# Patient Record
Sex: FEMALE
[De-identification: names, ages, dates, MRNs, and addresses within clinical notes are randomized per-mention and may not be internally consistent; named-entity substitution may affect disease eponyms.]

---

## 2023-05-15 PROBLEM — Z00.00 ENCOUNTER FOR PREVENTIVE HEALTH EXAMINATION: Status: ACTIVE | Noted: 2023-05-15

## 2023-05-18 ENCOUNTER — APPOINTMENT (OUTPATIENT)
Dept: ORTHOPEDIC SURGERY | Facility: CLINIC | Age: 63
End: 2023-05-18
Payer: SELF-PAY

## 2023-05-18 VITALS — HEIGHT: 67 IN | WEIGHT: 270 LBS | BODY MASS INDEX: 42.38 KG/M2

## 2023-05-18 DIAGNOSIS — Z78.9 OTHER SPECIFIED HEALTH STATUS: ICD-10-CM

## 2023-05-18 PROCEDURE — 20610 DRAIN/INJ JOINT/BURSA W/O US: CPT | Mod: 50

## 2023-05-18 PROCEDURE — 73562 X-RAY EXAM OF KNEE 3: CPT | Mod: 50

## 2023-05-18 PROCEDURE — 99214 OFFICE O/P EST MOD 30 MIN: CPT | Mod: 25

## 2023-05-18 PROCEDURE — J3490M: CUSTOM

## 2023-05-18 NOTE — DISCUSSION/SUMMARY
[de-identified] : Progress Note completed by Berkley Kaur PA-C\par * Dr. Malik -- The documentation recorded in this note accurately reflects the decisions made by me during this visit.

## 2023-05-18 NOTE — PHYSICAL EXAM
[Positive] : positive Goldy [NL (0)] : extension 0 degrees [5___] : hamstring 5[unfilled]/5 [Equivocal] : equivocal Goldy [] : patient ambulates without assistive device [Left] : left knee [Right] : right knee [Degenerative change] : Degenerative change [TWNoteComboBox7] : flexion 105 degrees

## 2023-05-18 NOTE — PROCEDURE
[Large Joint Injection] : Large joint injection [Bilateral] : bilaterally of the [Knee] : knee [Pain] : pain [Alcohol] : alcohol [Betadine] : betadine [___ cc    3mg] :  Betamethasone (Celestone) ~Vcc of 3mg [___ cc    1%] : Lidocaine ~Vcc of 1%  [___ cc    0.25%] : Bupivacaine (Marcaine) ~Vcc of 0.25%  [] : Patient tolerated procedure well [Call if redness, pain or fever occur] : call if redness, pain or fever occur [Apply ice for 15min out of every hour for the next 12-24 hours as tolerated] : apply ice for 15 minutes out of every hour for the next 12-24 hours as tolerated [Patient was advised to rest the joint(s) for ____ days] : patient was advised to rest the joint(s) for [unfilled] days [Previous OTC use and PT nontherapeutic] : patient has tried OTC's including aspirin, Ibuprofen, Aleve, etc or prescription NSAIDS, and/or exercises at home and/or physical therapy without satisfactory response [Patient had decreased mobility in the joint] : patient had decreased mobility in the joint [Risks, benefits, alternatives discussed / Verbal consent obtained] : the risks benefits, and alternatives have been discussed, and verbal consent was obtained [All ultrasound images have been permanently captured and stored accordingly in our picture archiving and communication system] : All ultrasound images have been permanently captured and stored accordingly in our picture archiving and communication system [Visualization of the needle and placement of injection was performed without complication] : visualization of the needle and placement of injection was performed without complication [Inflammation] : inflammation [de-identified] : for accurate placement of needle into knee joint.

## 2023-05-18 NOTE — ASSESSMENT
[FreeTextEntry1] : acute bilateral knee pain since feb 2023.  no injury.  right knee worse than left.   possible oa exacerbation, possible mmt rich on right.\par \par has tried rest, ice, acupuncture, lido patches, nsaids prn. 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

## 2023-05-18 NOTE — HISTORY OF PRESENT ILLNESS
[6] : 6 [0] : 0 [Burning] : burning [Dull/Aching] : dull/aching [Localized] : localized [Sharp] : sharp [Shooting] : shooting [Stabbing] : stabbing [Throbbing] : throbbing [Constant] : constant [Nothing helps with pain getting better] : Nothing helps with pain getting better [Walking] : walking [de-identified] : 5/18/23:  acute bilateral knee pain since feb 2023.  no injury.  [] : no [FreeTextEntry1] : bilateral knees  [FreeTextEntry6] : swelling

## 2023-06-15 ENCOUNTER — APPOINTMENT (OUTPATIENT)
Dept: ORTHOPEDIC SURGERY | Facility: CLINIC | Age: 63
End: 2023-06-15

## 2023-06-16 ENCOUNTER — APPOINTMENT (OUTPATIENT)
Dept: ORTHOPEDIC SURGERY | Facility: CLINIC | Age: 63
End: 2023-06-16
Payer: SELF-PAY

## 2023-06-16 VITALS — WEIGHT: 270 LBS | HEIGHT: 67 IN | BODY MASS INDEX: 42.38 KG/M2

## 2023-06-16 DIAGNOSIS — M23.91 UNSPECIFIED INTERNAL DERANGEMENT OF RIGHT KNEE: ICD-10-CM

## 2023-06-16 DIAGNOSIS — M23.92 UNSPECIFIED INTERNAL DERANGEMENT OF LEFT KNEE: ICD-10-CM

## 2023-06-16 PROCEDURE — 99213 OFFICE O/P EST LOW 20 MIN: CPT

## 2023-06-16 RX ORDER — DICLOFENAC SODIUM 75 MG/1
75 TABLET, DELAYED RELEASE ORAL TWICE DAILY
Qty: 60 | Refills: 0 | Status: ACTIVE | COMMUNITY
Start: 2023-06-16 | End: 1900-01-01

## 2023-06-16 NOTE — ASSESSMENT
[FreeTextEntry1] : acute bilateral knee pain since feb 2023.  no injury.  right knee worse than left.   possible oa exacerbation, possible mmt rich on right.  mild improvement with csi bilateral knees on 5/18/23.  right knee worse again.  possible lmt, mmt.  self pay. defers mri for now. 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

## 2023-06-16 NOTE — PHYSICAL EXAM
[Right] : right knee [Positive] : positive Goldy [NL (0)] : extension 0 degrees [5___] : hamstring 5[unfilled]/5 [] : light touch is intact throughout [Left] : left knee [TWNoteComboBox7] : flexion 110 degrees

## 2023-06-16 NOTE — HISTORY OF PRESENT ILLNESS
[Dull/Aching] : dull/aching [Localized] : localized [Throbbing] : throbbing [de-identified] : 5/18/23:  acute bilateral knee pain since feb 2023.  no injury. \par 6/16/23:  both knees got better after csi. right knee pain worse again. mild worsening of left again. [6] : 6 [0] : 0 [Burning] : burning [Sharp] : sharp [Shooting] : shooting [Stabbing] : stabbing [Constant] : constant [Nothing helps with pain getting better] : Nothing helps with pain getting better [Walking] : walking [] : no [FreeTextEntry1] : bilateral knees  [FreeTextEntry6] : swelling  [de-identified] : hep

## 2023-07-28 ENCOUNTER — APPOINTMENT (OUTPATIENT)
Dept: ORTHOPEDIC SURGERY | Facility: CLINIC | Age: 63
End: 2023-07-28
Payer: SELF-PAY

## 2023-07-28 VITALS — HEIGHT: 67 IN | BODY MASS INDEX: 42.38 KG/M2 | WEIGHT: 270 LBS

## 2023-07-28 DIAGNOSIS — M17.12 UNILATERAL PRIMARY OSTEOARTHRITIS, LEFT KNEE: ICD-10-CM

## 2023-07-28 DIAGNOSIS — M17.11 UNILATERAL PRIMARY OSTEOARTHRITIS, RIGHT KNEE: ICD-10-CM

## 2023-07-28 PROCEDURE — 99213 OFFICE O/P EST LOW 20 MIN: CPT

## 2023-07-28 RX ORDER — DICLOFENAC SODIUM 1% 10 MG/G
1 GEL TOPICAL DAILY
Qty: 1 | Refills: 0 | Status: ACTIVE | COMMUNITY
Start: 2023-07-28 | End: 1900-01-01

## 2023-07-28 RX ORDER — DICLOFENAC SODIUM 75 MG/1
75 TABLET, DELAYED RELEASE ORAL
Qty: 60 | Refills: 0 | Status: ACTIVE | COMMUNITY
Start: 2023-07-28 | End: 1900-01-01

## 2023-07-28 NOTE — HISTORY OF PRESENT ILLNESS
[Dull/Aching] : dull/aching [Localized] : localized [Throbbing] : throbbing [6] : 6 [0] : 0 [Burning] : burning [Sharp] : sharp [Shooting] : shooting [Stabbing] : stabbing [Constant] : constant [Nothing helps with pain getting better] : Nothing helps with pain getting better [Walking] : walking [de-identified] : 5/18/23:  acute bilateral knee pain since feb 2023.  no injury. \par 6/16/23:  both knees got better after csi. right knee pain worse again. mild worsening of left again.\par 7/28/23: pain improved but not completely resolved. taking diclofenac [] : no [FreeTextEntry1] : bilateral knees  [FreeTextEntry6] : swelling  [de-identified] : hep

## 2023-09-22 RX ORDER — DICLOFENAC SODIUM 1% 10 MG/G
1 GEL TOPICAL
Qty: 1 | Refills: 3 | Status: ACTIVE | COMMUNITY
Start: 2023-09-22 | End: 1900-01-01

## 2023-10-05 RX ORDER — DICLOFENAC SODIUM 75 MG/1
75 TABLET, DELAYED RELEASE ORAL
Qty: 60 | Refills: 2 | Status: ACTIVE | COMMUNITY
Start: 2023-10-05 | End: 1900-01-01